# Patient Record
Sex: FEMALE | Race: WHITE | NOT HISPANIC OR LATINO | Employment: PART TIME | URBAN - METROPOLITAN AREA
[De-identification: names, ages, dates, MRNs, and addresses within clinical notes are randomized per-mention and may not be internally consistent; named-entity substitution may affect disease eponyms.]

---

## 2021-09-09 ENCOUNTER — HOSPITAL ENCOUNTER (OUTPATIENT)
Dept: RADIOLOGY | Facility: MEDICAL CENTER | Age: 41
End: 2021-09-09
Attending: PSYCHIATRY & NEUROLOGY
Payer: MEDICAID

## 2021-09-09 DIAGNOSIS — G25.2 OTHER SPECIFIED FORMS OF TREMOR: ICD-10-CM

## 2021-09-09 DIAGNOSIS — G25.3 MYOCLONUS: ICD-10-CM

## 2021-09-09 DIAGNOSIS — M62.838 OTHER MUSCLE SPASM: ICD-10-CM

## 2021-09-09 PROCEDURE — 70551 MRI BRAIN STEM W/O DYE: CPT

## 2024-09-19 ENCOUNTER — APPOINTMENT (RX ONLY)
Dept: URBAN - METROPOLITAN AREA CLINIC 4 | Facility: CLINIC | Age: 44
Setting detail: DERMATOLOGY
End: 2024-09-19

## 2024-09-19 DIAGNOSIS — L81.5 LEUKODERMA, NOT ELSEWHERE CLASSIFIED: ICD-10-CM

## 2024-09-19 DIAGNOSIS — D18.0 HEMANGIOMA: ICD-10-CM

## 2024-09-19 DIAGNOSIS — L91.8 OTHER HYPERTROPHIC DISORDERS OF THE SKIN: ICD-10-CM

## 2024-09-19 DIAGNOSIS — Z71.89 OTHER SPECIFIED COUNSELING: ICD-10-CM

## 2024-09-19 DIAGNOSIS — Z80.8 FAMILY HISTORY OF MALIGNANT NEOPLASM OF OTHER ORGANS OR SYSTEMS: ICD-10-CM

## 2024-09-19 DIAGNOSIS — L81.4 OTHER MELANIN HYPERPIGMENTATION: ICD-10-CM

## 2024-09-19 DIAGNOSIS — L82.1 OTHER SEBORRHEIC KERATOSIS: ICD-10-CM

## 2024-09-19 DIAGNOSIS — D22 MELANOCYTIC NEVI: ICD-10-CM

## 2024-09-19 PROBLEM — D23.61 OTHER BENIGN NEOPLASM OF SKIN OF RIGHT UPPER LIMB, INCLUDING SHOULDER: Status: ACTIVE | Noted: 2024-09-19

## 2024-09-19 PROBLEM — D22.5 MELANOCYTIC NEVI OF TRUNK: Status: ACTIVE | Noted: 2024-09-19

## 2024-09-19 PROBLEM — D18.01 HEMANGIOMA OF SKIN AND SUBCUTANEOUS TISSUE: Status: ACTIVE | Noted: 2024-09-19

## 2024-09-19 PROCEDURE — ? SUNSCREEN RECOMMENDATIONS

## 2024-09-19 PROCEDURE — ? ADDITIONAL NOTES

## 2024-09-19 PROCEDURE — 99203 OFFICE O/P NEW LOW 30 MIN: CPT

## 2024-09-19 PROCEDURE — ? COUNSELING

## 2024-09-19 ASSESSMENT — LOCATION DETAILED DESCRIPTION DERM
LOCATION DETAILED: RIGHT SUPERIOR PARIETAL SCALP
LOCATION DETAILED: LEFT POSTERIOR SHOULDER
LOCATION DETAILED: INFERIOR THORACIC SPINE
LOCATION DETAILED: RIGHT CENTRAL FRONTAL SCALP
LOCATION DETAILED: PERIUMBILICAL SKIN
LOCATION DETAILED: RIGHT VENTRAL PROXIMAL FOREARM
LOCATION DETAILED: RIGHT MEDIAL SUPERIOR CHEST
LOCATION DETAILED: EPIGASTRIC SKIN
LOCATION DETAILED: LEFT CENTRAL PARIETAL SCALP
LOCATION DETAILED: RIGHT ANKLE
LOCATION DETAILED: LEFT VENTRAL PROXIMAL FOREARM
LOCATION DETAILED: LEFT LATERAL SUPERIOR CHEST
LOCATION DETAILED: RIGHT POSTERIOR SHOULDER

## 2024-09-19 ASSESSMENT — LOCATION SIMPLE DESCRIPTION DERM
LOCATION SIMPLE: CHEST
LOCATION SIMPLE: UPPER BACK
LOCATION SIMPLE: RIGHT ANKLE
LOCATION SIMPLE: RIGHT FOREARM
LOCATION SIMPLE: SCALP
LOCATION SIMPLE: RIGHT SHOULDER
LOCATION SIMPLE: LEFT FOREARM
LOCATION SIMPLE: ABDOMEN
LOCATION SIMPLE: LEFT SHOULDER
LOCATION SIMPLE: RIGHT SCALP

## 2024-09-19 ASSESSMENT — LOCATION ZONE DERM
LOCATION ZONE: TRUNK
LOCATION ZONE: SCALP
LOCATION ZONE: ARM
LOCATION ZONE: LEG
LOCATION ZONE: ARM

## 2025-02-05 ENCOUNTER — OFFICE VISIT (OUTPATIENT)
Dept: NEUROLOGY | Facility: MEDICAL CENTER | Age: 45
End: 2025-02-05
Attending: PSYCHIATRY & NEUROLOGY
Payer: COMMERCIAL

## 2025-02-05 VITALS
OXYGEN SATURATION: 95 % | BODY MASS INDEX: 39.23 KG/M2 | HEART RATE: 88 BPM | TEMPERATURE: 96.4 F | HEIGHT: 62 IN | WEIGHT: 213.19 LBS | DIASTOLIC BLOOD PRESSURE: 80 MMHG | SYSTOLIC BLOOD PRESSURE: 120 MMHG | RESPIRATION RATE: 18 BRPM

## 2025-02-05 DIAGNOSIS — G25.3 PALATAL MYOCLONUS: ICD-10-CM

## 2025-02-05 PROCEDURE — 3074F SYST BP LT 130 MM HG: CPT | Performed by: PSYCHIATRY & NEUROLOGY

## 2025-02-05 PROCEDURE — 99203 OFFICE O/P NEW LOW 30 MIN: CPT | Performed by: PSYCHIATRY & NEUROLOGY

## 2025-02-05 PROCEDURE — 99202 OFFICE O/P NEW SF 15 MIN: CPT | Performed by: PSYCHIATRY & NEUROLOGY

## 2025-02-05 PROCEDURE — 3079F DIAST BP 80-89 MM HG: CPT | Performed by: PSYCHIATRY & NEUROLOGY

## 2025-02-05 ASSESSMENT — PATIENT HEALTH QUESTIONNAIRE - PHQ9: CLINICAL INTERPRETATION OF PHQ2 SCORE: 0

## 2025-02-05 NOTE — PROGRESS NOTES
Chief Complaint   Patient presents with    New Patient     Movement disorder        History of present illness:  Lianne Brito 44 y.o. 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 She was diagnosed by Dr. Washington with palatal myoclonus. She has ear clicking and constant movement of the soft palate and chronic ear pressure for the last 2-3 years. No gait imbalance or vertigo. She has been treated by ENT and she has had tubes placed in her ears.      She has worsened ear pressure with changes in weather.   She has been on anti-seizure drugs in the past that were not effective for treatment of this. She has not tried clonazepam.     Past medical history:   Past Medical History:   Diagnosis Date    Gynecological disorder     fibroids, pelvic pain    Urinary incontinence        Past surgical history:   Past Surgical History:   Procedure Laterality Date    VAGINAL HYSTERECTOMY SCOPE TOTAL Bilateral 1/4/2016    Procedure: VAGINAL HYSTERECTOMY SCOPE TOTAL with bilateral salpingectomy  ;  Surgeon: Leobardo Montanez M.D.;  Location: SURGERY SAME DAY [facility];  Service:     ANTERIOR AND POSTERIOR REPAIR N/A 1/4/2016    Procedure: ANTERIOR AND POSTERIOR REPAIR;  Surgeon: Leobardo Montanez M.D.;  Location: SURGERY SAME DAY [facility];  Service:     ENTEROCELE REPAIR N/A 1/4/2016    Procedure: ENTEROCELE REPAIR;  Surgeon: Leobardo Montanez M.D.;  Location: SURGERY SAME DAY [facility];  Service:     VAGINAL SUSPENSION N/A 1/4/2016    Procedure: VAGINAL SUSPENSION sacrospinous vault , perineoplasty  ;  Surgeon: Leobardo Montanez M.D.;  Location: SURGERY SAME DAY [facility];  Service:     BLADDER SLING FEMALE N/A 1/4/2016    Procedure: BLADDER SLING FEMALE TOT ;  Surgeon: Leobardo Montanez M.D.;  Location: SURGERY SAME DAY [facility];  Service:     CYSTOSCOPY Bilateral 1/4/2016    Procedure: CYSTOSCOPY;  Surgeon: Leobardo Montanez M.D.;  Location: SURGERY SAME DAY [facility];  Service:     ANKLE FUSION Right 2005  "      Family history:   No family history on file.    Social history:   Social History     Socioeconomic History    Marital status: Single     Spouse name: Not on file    Number of children: Not on file    Years of education: Not on file    Highest education level: Not on file   Occupational History    Not on file   Tobacco Use    Smoking status: Never    Smokeless tobacco: Never   Vaping Use    Vaping status: Never Used   Substance and Sexual Activity    Alcohol use: Yes     Alcohol/week: 0.0 oz     Comment: 3-4 per week    Drug use: No    Sexual activity: Yes     Birth control/protection: Condom   Other Topics Concern    Not on file   Social History Narrative    Not on file     Social Drivers of Health     Financial Resource Strain: Not on file   Food Insecurity: Not on file   Transportation Needs: Not on file   Physical Activity: Not on file   Stress: Not on file   Social Connections: Not on file   Intimate Partner Violence: Not on file   Housing Stability: Not on file       Current medications:   Current Outpatient Medications   Medication    Multiple Vitamin (DAILY MULTIVITAMIN PO)    CHLOROPHYLL PO    Lactobacillus (ACIDOPHILUS PO)    5-Hydroxytryptophan (5-HTP PO)     No current facility-administered medications for this visit.       Medication Allergy:  No Known Allergies    Physical examination:   Vitals:    02/05/25 1003   BP: 120/80   BP Location: Left arm   Patient Position: Sitting   BP Cuff Size: Adult   Pulse: 88   Resp: 18   Temp: (!) 35.8 °C (96.4 °F)   TempSrc: Temporal   SpO2: 95%   Weight: 96.7 kg (213 lb 3 oz)   Height: 1.575 m (5' 2\")     Neurological Exam  Mental Status  Awake and alert. Speech is normal. Language is fluent with no aphasia.    Cranial Nerves  CN III, IV, VI: Extraocular movements intact bilaterally. No nystagmus. Normal smooth pursuit.  Constant rhythmic myoclonus of the soft palate .    Motor   No abnormal involuntary movements. Strength is 5/5 throughout all four " extremities.    Coordination  Right: Finger-to-nose normal. Rapid alternating movement normal.Left: Finger-to-nose normal. Rapid alternating movement normal.    Gait  Casual gait is normal including stance, stride, and arm swing. Normal tandem gait.      Imagin MRI BRAIN W/O CONTRAST   I have reviewed the images personally. This is a normal scan.       ASSESSMENT AND PLAN:  Problem List Items Addressed This Visit       Palatal myoclonus       1. 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  She has constant rhythmic myoclonus of the soft palate, without associated signs of cerebellar ataxia.  MRI of the brain was normal, and did not show any brainstem lesion or olivary hypertrophy.  In the past, she reports she has been treated with antiseizure drugs by Dr. Loo, which have been not successful in treating her myoclonus.  Has never tried clonazepam previously, however does not wish to start this due to the risk of sedation.  She does desire botulinum toxin injections and is scheduled to see a ENT specialist for this treatment.  This is reasonable however I have no experience with soft palate injections and would defer this treatment to ENT.    FOLLOW-UP:   No follow-ups on file.    Total time spent for the day for this patient unrelated to procedure time is: 24 minutes. I spent 17 minutes in face to face time and I spent 3 minutes pre-charting and 4 minutes in post-visit documentation.      Dr. Xander Javed D.O.  [facility]  Movement Disorders Specialist